# Patient Record
Sex: MALE | Race: WHITE | URBAN - METROPOLITAN AREA
[De-identification: names, ages, dates, MRNs, and addresses within clinical notes are randomized per-mention and may not be internally consistent; named-entity substitution may affect disease eponyms.]

---

## 2024-03-21 ENCOUNTER — DOCUMENTATION (OUTPATIENT)
Dept: HEMATOLOGY/ONCOLOGY | Facility: HOSPITAL | Age: 79
End: 2024-03-21

## 2024-03-21 NOTE — PROGRESS NOTES
3/21/24 1510  Received referral for this patient from Dr. Wagner. 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 When I spoke with wife, patient is receiving tx at [facility] the issue is that they would like to have their AZA shots done at [place] but there is no availability at [place]. So for the 7 days on of AZA they would have to go to [facility] main [place]. Wife is the  and does not want to have to drive to [place] for treatment. She is wanting to have  be treated locally. I explained to her that no matter where they have tx they really could not avoid coming to [facility] at least initially because patient would need to be evaluated by an MD in person and sign consent. We would not have MD availability at [facility] until late April, early may. Wife said she would need to think about this. She will give e a call back if she decides to come to . AARON Kebede